# Patient Record
Sex: MALE | Race: ASIAN | NOT HISPANIC OR LATINO | Employment: STUDENT | URBAN - METROPOLITAN AREA
[De-identification: names, ages, dates, MRNs, and addresses within clinical notes are randomized per-mention and may not be internally consistent; named-entity substitution may affect disease eponyms.]

---

## 2017-07-19 ENCOUNTER — HOSPITAL ENCOUNTER (EMERGENCY)
Facility: MEDICAL CENTER | Age: 22
End: 2017-07-19
Attending: EMERGENCY MEDICINE
Payer: COMMERCIAL

## 2017-07-19 VITALS
BODY MASS INDEX: 43.44 KG/M2 | HEART RATE: 74 BPM | WEIGHT: 286.6 LBS | RESPIRATION RATE: 21 BRPM | TEMPERATURE: 97.6 F | DIASTOLIC BLOOD PRESSURE: 103 MMHG | SYSTOLIC BLOOD PRESSURE: 170 MMHG | OXYGEN SATURATION: 95 % | HEIGHT: 68 IN

## 2017-07-19 DIAGNOSIS — S39.012A LUMBAR STRAIN, INITIAL ENCOUNTER: ICD-10-CM

## 2017-07-19 PROCEDURE — 99283 EMERGENCY DEPT VISIT LOW MDM: CPT

## 2017-07-19 PROCEDURE — 700111 HCHG RX REV CODE 636 W/ 250 OVERRIDE (IP): Performed by: EMERGENCY MEDICINE

## 2017-07-19 RX ORDER — PREDNISONE 20 MG/1
60 TABLET ORAL DAILY
Qty: 15 TAB | Refills: 0 | Status: SHIPPED | OUTPATIENT
Start: 2017-07-19 | End: 2017-07-24

## 2017-07-19 RX ORDER — OXYCODONE HYDROCHLORIDE AND ACETAMINOPHEN 5; 325 MG/1; MG/1
1-2 TABLET ORAL EVERY 4 HOURS PRN
Qty: 15 TAB | Refills: 0 | Status: SHIPPED | OUTPATIENT
Start: 2017-07-19

## 2017-07-19 RX ORDER — PREDNISONE 20 MG/1
60 TABLET ORAL ONCE
Status: COMPLETED | OUTPATIENT
Start: 2017-07-19 | End: 2017-07-19

## 2017-07-19 RX ADMIN — FENTANYL CITRATE 100 MCG: 50 INJECTION, SOLUTION INTRAMUSCULAR; INTRAVENOUS at 02:47

## 2017-07-19 RX ADMIN — PREDNISONE 60 MG: 20 TABLET ORAL at 02:46

## 2017-07-19 ASSESSMENT — LIFESTYLE VARIABLES: DO YOU DRINK ALCOHOL: NO

## 2017-07-19 ASSESSMENT — PAIN SCALES - GENERAL: PAINLEVEL_OUTOF10: 7

## 2017-07-19 NOTE — ED AVS SNAPSHOT
BOKU Access Code: [email or web address]  Expires: 8/18/2017  3:28 AM    Your email address is not on file at [facility].  Email Addresses are required for you to sign up for BOKU, please contact [phone] to verify your personal information and to provide your email address prior to attempting to register for BOKU.    Arnaud Nieves  No address on file    BOKU  A secure, online tool to manage your health information     [facility]’s BOKU® is a secure, online tool that connects you to your personalized health information from the privacy of your home -- day or night - making it very easy for you to manage your healthcare. Once the activation process is completed, you can even access your medical information using the BOKU raven, which is available for free in the Apple Raven store or Google Play store.     To learn more about BOKU, visit [email or web address]    There are two levels of access available (as shown below):   My Chart Features  [facility] Primary Care Doctor [facility]  Specialists [facility]  Urgent  Care Non-[facility] Primary Care Doctor   Email your healthcare team securely and privately 24/7 X X X    Manage appointments: schedule your next appointment; view details of past/upcoming appointments X      Request prescription refills. X      View recent personal medical records, including lab and immunizations X X X X   View health record, including health history, allergies, medications X X X X   Read reports about your outpatient visits, procedures, consult and ER notes X X X X   See your discharge summary, which is a recap of your hospital and/or ER visit that includes your diagnosis, lab results, and care plan X X  X     How to register for [employer]:  Once your e-mail address has been verified, follow the following steps to sign up for BOKU.     1. Go to  [email or web address]  2. Click on the Sign Up Now box, which takes you to the New Member Sign Up page. You will need to provide  the following information:  a. Enter your [employer] Access Code exactly as it appears at the top of this page. (You will not need to use this code after you’ve completed the sign-up process. If you do not sign up before the expiration date, you must request a new code.)   b. Enter your date of birth.   c. Enter your home email address.   d. Click Submit, and follow the next screen’s instructions.  3. Create a Direct Sitters ID. This will be your Direct Sitters login ID and cannot be changed, so think of one that is secure and easy to remember.  4. Create a [employer]ters password. You can change your password at any time.  5. Enter your Password Reset Question and Answer. This can be used at a later time if you forget your password.   6. Enter your e-mail address. This allows you to receive e-mail notifications when new information is available in Direct Sitters.  7. Click Sign Up. You can now view your health information.    For assistance activating your [employer] account, call [phone]

## 2017-07-19 NOTE — ED NOTES
"Chief Complaint   Patient presents with   • GLF   • Low Back Pain   • Tingling     Patient ambulatory to triage with assistance. Patient states that he [place] the dog park around 1930 this evening. Patient bent over and his \"legs gave out\". Patient reports falling to his side. Was unable to stand and had to crawl to a near-by bench. Patient c/o of low back pain, tingling to his legs and feet, and right sided lateral abdominal pain. He also says this is the second time this has happened to him. /103 mmHg  Pulse 93  Temp(Src) 36.4 °C (97.6 °F)  Resp 16  Ht 1.727 m (5' 8\")  Wt 130 kg (286 lb 9.6 oz)  BMI 43.59 kg/m2  SpO2 98%    "

## 2017-07-19 NOTE — ED AVS SNAPSHOT
7/19/2017    Arnaud Nieves  No address on file.    Dear Arnaud:    [facility] wants to ensure your discharge home is safe and you or your loved ones have had all of your questions answered regarding your care after you leave the hospital.    Below is a list of resources and contact information should you have any questions regarding your hospital stay, follow-up instructions, or active medical symptoms.    Questions or Concerns Regarding… Contact   Medical Questions Related to Your Discharge  (7 days a week, 8am-5pm) Contact a Nurse Care Coordinator   [phone]   Medical Questions Not Related to Your Discharge  (24 hours a day / 7 days a week)  Contact the Nurse Health Line   [phone]    Medications or Discharge Instructions Refer to your discharge packet   or contact your [facility] Primary Care Provider   [phone]   Follow-up Appointment(s) Schedule your appointment via [employer]   or contact Scheduling [phone]   Billing Review your statement via [employer]  or contact Billing [phone]   Medical Records Review your records via [employer]   or contact Medical Records [phone]     You may receive a telephone call within two days of discharge. This call is to make certain you understand your discharge instructions and have the opportunity to have any questions answered. You can also easily access your medical information, test results and upcoming appointments via the [employer]Card free online health management tool. You can learn more and sign up at [employer]Card. For assistance setting up your [employer] account, please call [phone].    Once again, we want to ensure your discharge home is safe and that you have a clear understanding of any next steps in your care. If you have any questions or concerns, please do not hesitate to contact us, we are here for you. Thank you for choosing [facility] for your healthcare needs.    Sincerely,    Your [facility] Healthcare Team

## 2017-07-19 NOTE — ED PROVIDER NOTES
"ED Provider Note    CHIEF COMPLAINT  Chief Complaint   Patient presents with   • GLF   • Low Back Pain   • Tingling       HPI  Arnaud Nieves is a 22 y.o. male who presents to the emergency department with low back discomfort. The patient states he is walking his dog at the park when he bent over and had sudden onset of low back pain with some tingling to his legs. He states he fell to the ground as he has significant pain with attempted ambulation. He does not think that he has any loss of function of his legs but has severe pain with any attempts at ambulation. The patient has urinated since that time with no difficulty. The patient does not have any associated fevers. He states his pain is worse with certain movements.    REVIEW OF SYSTEMS  See HPI for further details. All other systems are negative.     PAST MEDICAL HISTORY  No past medical history on file.    SOCIAL HISTORY  Social History     Social History   • Marital Status: N/A     Spouse Name: N/A   • Number of Children: N/A   • Years of Education: N/A     Social History Main Topics   • Smoking status: Not on file   • Smokeless tobacco: Not on file   • Alcohol Use: Not on file   • Drug Use: Not on file   • Sexual Activity: Not on file     Other Topics Concern   • Not on file     Social History Narrative   • No narrative on file           PHYSICAL EXAM  VITAL SIGNS: /103 mmHg  Pulse 93  Temp(Src) 36.4 °C (97.6 °F)  Resp 16  Ht 1.727 m (5' 8\")  Wt 130 kg (286 lb 9.6 oz)  BMI 43.59 kg/m2  SpO2 98%  Constitutional: in acute distress, Non-toxic appearance.   HENT: Normocephalic, Atraumatic, tympanic membranes are intact and nonerythematous bilaterally, Oropharynx moist without exudates or erythema, Nose normal.   Eyes: PERRLA, EOMI, Conjunctiva normal.  Neck: Supple without meningismus  Lymphatic: No lymphadenopathy noted.   Cardiovascular: Normal heart rate, Normal rhythm, No murmurs, No rubs, No gallops.   Thorax & Lungs: Normal breath sounds, " No respiratory distress, No wheezing, No chest tenderness.   Abdomen: Bowel sounds normal, Soft, No tenderness, no rebound, no guarding, no distention, No masses, No pulsatile masses.   Skin: Warm, Dry, No erythema, No rash.   Back: Paraspinal muscle discomfort in the lumbar spine with no midline tenderness or step-offs   Extremities: Atraumatic with symmetric distal pulses, No edema, No tenderness, No cyanosis, No clubbing.   Neurologic: Motor is 5 out of 5 and symmetric throughout his lower extremities, sensation is intact  Psychiatric: Affect normal, Judgment normal, Mood normal.       COURSE & MEDICAL DECISION MAKING  Pertinent Labs & Imaging studies reviewed. (See chart for details)  This a 22-year-old male who presents to the emergency department with low back discomfort. The patient received intranasal fentanyl as well as prednisone orally. After approximately 45 minutes I was able to get the patient up and he can ambulate although with some discomfort. He does not have any weakness to his lower extremities. Therefore the patient will be discharged on prednisone and Percocet. He'll return to the emergency department for any loss of function to his lower extremities, persistent pain, or difficulty initiating stream of urine.    FINAL IMPRESSION  1. Low back pain suspect lumbar strain versus radiculopathy     Disposition  The patient will be discharged in improved condition    Electronically signed by: Jonathan Yoo, 7/19/2017 2:41 AM

## 2017-07-19 NOTE — ED AVS SNAPSHOT
Home Care Instructions                                                                                                                Arnaud Nieves   MRN: [id number]    Department:  [facility], Emergency Dept   Date of Visit:  7/19/2017            [facility], Emergency Dept    [address] [address]    Phone:  [phone]      You were seen by     Jonathan Yoo M.D.      Your Diagnosis Was     Lumbar strain, initial encounter     S39.012A       These are the medications you received during your hospitalization from 07/19/2017 0120 to 07/19/2017 0328     Date/Time Order Dose Route Action    07/19/2017 0247 fentaNYL (SUBLIMAZE) injection 100 mcg 100 mcg Nasal Given    07/19/2017 0246 predniSONE (DELTASONE) tablet 60 mg 60 mg Oral Given      Follow-up Information     1. Follow up with [facility], Emergency Dept.    Specialty:  Emergency Medicine    Why:  If symptoms worsen    Contact information    [address] [place] [id number] [phone]      Medication Information     Review all of your home medications and newly ordered medications with your primary doctor and/or pharmacist as soon as possible. Follow medication instructions as directed by your doctor and/or pharmacist.     Please keep your complete medication list with you and share with your physician. Update the information when medications are discontinued, doses are changed, or new medications (including over-the-counter products) are added; and carry medication information at all times in the event of emergency situations.               Medication List      START taking these medications        Instructions    Morning Afternoon Evening Bedtime    oxycodone-acetaminophen 5-325 MG Tabs   Commonly known as:  PERCOCET        Take 1-2 Tabs by mouth every four hours as needed.   Dose:  1-2 Tab                        predniSONE 20 MG Tabs   Last time this was given:  60 mg  on 7/19/2017  2:46 AM   Commonly known as:  DELTASONE        Take 3 Tabs by mouth every day for 5 days. Take with food   Dose:  60 mg                             Where to Get Your Medications      You can get these medications from any pharmacy     Bring a paper prescription for each of these medications    - oxycodone-acetaminophen 5-325 MG Tabs  - predniSONE 20 MG Tabs              Discharge Instructions       Return for increased pain or weakness to her lower extremities          Patient Information     Patient Information    Following emergency treatment: all patient requiring follow-up care must return either to a private physician or a clinic if your condition worsens before you are able to obtain further medical attention, please return to the emergency room.     Billing Information    At [facility], we work to make the billing process streamlined for our patients.  Our Representatives are here to answer any questions you may have regarding your hospital bill.  If you have insurance coverage and have supplied your insurance information to us, we will submit a claim to your insurer on your behalf.  Should you have any questions regarding your bill, we can be reached online or by phone as follows:  Online: You are able pay your bills online or live chat with our representatives about any billing questions you may have. We are here to help Monday - Friday from 8:00am to 7:30pm and 9:00am - 12:00pm on Saturdays.  Please visit [email or web address]  for more information.   Phone:  [phone] or [phone]    Please note that your emergency physician, surgeon, pathologist, radiologist, anesthesiologist, and other specialists are not employed by [facility] and will therefore bill separately for their services.  Please contact them directly for any questions concerning their bills at the numbers below:     Emergency Physician Services:  [phone]  [facility]:  [phone]  [facility]:  [phone]  [facility]:  [phone]    1. Your final bill may vary from the amount quoted upon discharge if all procedures are not complete at that time, or if your doctor has additional procedures of which we are not aware. You will receive an additional bill if you return to the Emergency Department at [facility] for suture removal regardless of the facility of which the sutures were placed.     2. Please arrange for settlement of this account at the emergency registration.    3. All self-pay accounts are due in full at the time of treatment.  If you are unable to meet this obligation then payment is expected within 4-5 days.     4. If you have had radiology studies (CT, X-ray, Ultrasound, MRI), you have received a preliminary result during your emergency department visit. Please contact the radiology department [phone] to receive a copy of your final result. Please discuss the Final result with your primary physician or with the follow up physician provided.     Crisis Hotline:  [place] Crisis Hotline:  0-518-NMYNYIU or 1-396.962.2910  [place] Crisis Hotline:    1-348.796.8344 or 883-642-8113         ED Discharge Follow Up Questions    1. In order to provide you with very good care, we would like to follow up with a phone call in the next few days.  May we have your permission to contact you?     YES /  NO    2. What is the best phone number to call you? (       )_____-__________    3. What is the best time to call you?      Morning  /  Afternoon  /  Evening                   Patient Signature:  ____________________________________________________________    Date:  ____________________________________________________________